# Patient Record
Sex: MALE | Race: WHITE | Employment: FULL TIME | URBAN - METROPOLITAN AREA
[De-identification: names, ages, dates, MRNs, and addresses within clinical notes are randomized per-mention and may not be internally consistent; named-entity substitution may affect disease eponyms.]

---

## 2020-06-05 ENCOUNTER — APPOINTMENT (OUTPATIENT)
Dept: GENERAL RADIOLOGY | Age: 45
End: 2020-06-05
Payer: COMMERCIAL

## 2020-06-05 ENCOUNTER — HOSPITAL ENCOUNTER (EMERGENCY)
Age: 45
Discharge: HOME | End: 2020-06-05
Payer: COMMERCIAL

## 2020-06-05 VITALS
TEMPERATURE: 98.6 F | HEIGHT: 78 IN | SYSTOLIC BLOOD PRESSURE: 110 MMHG | RESPIRATION RATE: 15 BRPM | HEART RATE: 48 BPM | WEIGHT: 249.12 LBS | DIASTOLIC BLOOD PRESSURE: 73 MMHG | OXYGEN SATURATION: 94 % | BODY MASS INDEX: 28.82 KG/M2

## 2020-06-05 LAB
A/G RATIO: 1.6 (ref 1.1–2.2)
ALBUMIN SERPL-MCNC: 4.4 G/DL (ref 3.4–5)
ALP BLD-CCNC: 66 U/L (ref 40–129)
ALT SERPL-CCNC: 24 U/L (ref 10–40)
ANION GAP SERPL CALCULATED.3IONS-SCNC: 11 MMOL/L (ref 3–16)
AST SERPL-CCNC: 20 U/L (ref 15–37)
BASOPHILS ABSOLUTE: 0 K/UL (ref 0–0.2)
BASOPHILS RELATIVE PERCENT: 0.6 %
BILIRUB SERPL-MCNC: 0.4 MG/DL (ref 0–1)
BUN BLDV-MCNC: 9 MG/DL (ref 7–20)
CALCIUM SERPL-MCNC: 9.1 MG/DL (ref 8.3–10.6)
CHLORIDE BLD-SCNC: 109 MMOL/L (ref 99–110)
CHP ED QC CHECK: YES
CO2: 20 MMOL/L (ref 21–32)
CREAT SERPL-MCNC: 0.9 MG/DL (ref 0.9–1.3)
D DIMER: 0.28 UG/ML FEU
EKG ATRIAL RATE: 64 BPM
EKG DIAGNOSIS: NORMAL
EKG P AXIS: 28 DEGREES
EKG P-R INTERVAL: 138 MS
EKG Q-T INTERVAL: 422 MS
EKG QRS DURATION: 110 MS
EKG QTC CALCULATION (BAZETT): 435 MS
EKG R AXIS: 19 DEGREES
EKG T AXIS: 29 DEGREES
EKG VENTRICULAR RATE: 64 BPM
EOSINOPHILS ABSOLUTE: 0.2 K/UL (ref 0–0.6)
EOSINOPHILS RELATIVE PERCENT: 3.2 %
GFR AFRICAN AMERICAN: >60
GFR NON-AFRICAN AMERICAN: >60
GLOBULIN: 2.8 G/DL
GLUCOSE BLD-MCNC: 107 MG/DL
GLUCOSE BLD-MCNC: 107 MG/DL (ref 70–99)
GLUCOSE BLD-MCNC: 112 MG/DL (ref 70–99)
HCT VFR BLD CALC: 40.4 % (ref 40.5–52.5)
HEMOGLOBIN: 13.3 G/DL (ref 13.5–17.5)
LYMPHOCYTES ABSOLUTE: 0.9 K/UL (ref 1–5.1)
LYMPHOCYTES RELATIVE PERCENT: 17.5 %
MCH RBC QN AUTO: 29.3 PG (ref 26–34)
MCHC RBC AUTO-ENTMCNC: 32.9 G/DL (ref 31–36)
MCV RBC AUTO: 88.9 FL (ref 80–100)
MONOCYTES ABSOLUTE: 0.5 K/UL (ref 0–1.3)
MONOCYTES RELATIVE PERCENT: 9.9 %
NEUTROPHILS ABSOLUTE: 3.8 K/UL (ref 1.7–7.7)
NEUTROPHILS RELATIVE PERCENT: 68.8 %
PDW BLD-RTO: 13.1 % (ref 12.4–15.4)
PERFORMED ON: ABNORMAL
PLATELET # BLD: 296 K/UL (ref 135–450)
PMV BLD AUTO: 7.2 FL (ref 5–10.5)
POTASSIUM REFLEX MAGNESIUM: 4 MMOL/L (ref 3.5–5.1)
RBC # BLD: 4.55 M/UL (ref 4.2–5.9)
SODIUM BLD-SCNC: 140 MMOL/L (ref 136–145)
TOTAL PROTEIN: 7.2 G/DL (ref 6.4–8.2)
TROPONIN: <0.01 NG/ML
WBC # BLD: 5.4 K/UL (ref 4–11)

## 2020-06-05 PROCEDURE — 85379 FIBRIN DEGRADATION QUANT: CPT

## 2020-06-05 PROCEDURE — 71046 X-RAY EXAM CHEST 2 VIEWS: CPT

## 2020-06-05 PROCEDURE — 99284 EMERGENCY DEPT VISIT MOD MDM: CPT

## 2020-06-05 PROCEDURE — 84484 ASSAY OF TROPONIN QUANT: CPT

## 2020-06-05 PROCEDURE — 93010 ELECTROCARDIOGRAM REPORT: CPT | Performed by: INTERNAL MEDICINE

## 2020-06-05 PROCEDURE — 85025 COMPLETE CBC W/AUTO DIFF WBC: CPT

## 2020-06-05 PROCEDURE — 36415 COLL VENOUS BLD VENIPUNCTURE: CPT

## 2020-06-05 PROCEDURE — 82947 ASSAY GLUCOSE BLOOD QUANT: CPT

## 2020-06-05 PROCEDURE — 80053 COMPREHEN METABOLIC PANEL: CPT

## 2020-06-05 PROCEDURE — 93005 ELECTROCARDIOGRAM TRACING: CPT | Performed by: PHYSICIAN ASSISTANT

## 2020-06-05 ASSESSMENT — ENCOUNTER SYMPTOMS
NAUSEA: 0
SINUS PAIN: 0
DIARRHEA: 0
ABDOMINAL PAIN: 0
RHINORRHEA: 0
SORE THROAT: 0
EYE DISCHARGE: 0
SHORTNESS OF BREATH: 0
CHEST TIGHTNESS: 0
COUGH: 0
CONSTIPATION: 0
SINUS PRESSURE: 0
VOMITING: 0
EYE REDNESS: 0

## 2020-06-05 NOTE — ED PROVIDER NOTES
[address] ENCOUNTER        Pt Name: Hannah Mejia  MRN: [id number]  [place] 1975  Date of evaluation: 6/5/2020  Provider: Gretchen Oliva PA-C  PCP: Jake Tesfaye, [address] [place] [address]       Chief Complaint   Patient presents with    Anxiety     pt. just not feeling right for past 2 days, under \"immense\" amount of stress, no chest pain or shortness of breath    Fatigue     pt. feels light headed and tingling all over today       HISTORY OF PRESENT ILLNESS   (Location/Symptom, Timing/Onset, Context/Setting, Quality, Duration, Modifying Factors, Severity)  Note limiting factors. Hannah Mejia is a 41 y.o. male presents to the ED via private vehicle accompanied by his wife who is at bedside for a 1 day history of feeling palpations in his chest, intermittent, consistent with past times patient has been in atrial fibrillation. He reports associated paresthesias to upper extremities and his toes in addition to a near syncope-like sensation. Patient denies any shortness of breath fevers coughing or nausea. He has had a tremendous amount of stress in his life recently. Patient denies a history of hypertension denies any family history of coronary artery disease. Patient is not diabetic. Patient does use chewing tobacco.  But he is a non-smoker. Patient takes a daily aspirin. Nursing Notes were all reviewed and agreed with or any disagreements were addressed  in the HPI. REVIEW OF SYSTEMS  (2-9 systems for level 4, 10 or more for level 5)     Review of Systems   Constitutional: Negative for chills and fever. HENT: Negative. Negative for congestion, rhinorrhea, sinus pressure, sinus pain and sore throat. Eyes: Negative for discharge, redness and visual disturbance. Respiratory: Negative for cough, chest tightness and shortness of breath. Cardiovascular: Positive for palpitations. Negative for chest pain.    Gastrointestinal: Negative for abdominal pain, constipation, diarrhea, nausea and vomiting. Genitourinary: Negative for difficulty urinating, dysuria and frequency. Musculoskeletal: Negative. Skin: Negative. Neurological: Positive for light-headedness and numbness. Negative for dizziness, weakness and headaches. Psychiatric/Behavioral: Negative. All other systems reviewed and are negative. Positivesand Pertinent negatives as per HPI. Except as noted above in the ROS, all other systems were reviewed and negative. PAST MEDICAL HISTORY     Past Medical History:   Diagnosis Date    Atrial fibrillation ([facility] [address]     Headache          SURGICAL HISTORY     History reviewed. No pertinent surgical history. CURRENT MEDICATIONS       There are no discharge medications for this patient. ALLERGIES     Sulfa antibiotics    FAMILY HISTORY     History reviewed. No pertinent family history.       SOCIAL HISTORY       Social History     Socioeconomic History    Marital status:      Spouse name: None    Number of children: None    Years of education: None    Highest education level: None   Occupational History    None   Social Needs    Financial resource strain: None    Food insecurity     Worry: None     Inability: None    Transportation needs     Medical: None     Non-medical: None   Tobacco Use    Smoking status: Never Smoker    Smokeless tobacco: Current User     Types: Chew   Substance and Sexual Activity    Alcohol use: Yes     Comment: occasionally    Drug use: Never    Sexual activity: Yes     Partners: Female   Lifestyle    Physical activity     Days per week: None     Minutes per session: None    Stress: None   Relationships    Social connections     Talks on phone: None     Gets together: None     Attends Quaker service: None     Active member of club or organization: None     Attends meetings of clubs or organizations: None     Relationship status: None    Intimate partner American >60 >60    GFR African American >60 >60    Calcium 9.1 8.3 - 10.6 mg/dL    Total Protein 7.2 6.4 - 8.2 g/dL    Alb 4.4 3.4 - 5.0 g/dL    Albumin/Globulin Ratio 1.6 1.1 - 2.2    Total Bilirubin 0.4 0.0 - 1.0 mg/dL    Alkaline Phosphatase 66 40 - 129 U/L    ALT 24 10 - 40 U/L    AST 20 15 - 37 U/L    Globulin 2.8 g/dL   Troponin   Result Value Ref Range    Troponin <0.01 <0.01 ng/mL   D-Dimer, Quantitative   Result Value Ref Range    D-Dimer, Quant 0.28 <0.50 ug/mL FEU   POCT Glucose   Result Value Ref Range    Glucose 107 mg/dL    QC OK? yes    POCT Glucose   Result Value Ref Range    POC Glucose 107 (H) 70 - 99 mg/dl    Performed on ACCU-CHEK    EKG 12 Lead   Result Value Ref Range    Ventricular Rate 64 BPM    Atrial Rate 64 BPM    P-R Interval 138 ms    QRS Duration 110 ms    Q-T Interval 422 ms    QTc Calculation (Bazett) 435 ms    P Axis 28 degrees    R Axis 19 degrees    T Axis 29 degrees    Diagnosis       Normal sinus rhythmRSR' pattern in [id number] ECGNo previous ECGs availableConfirmed by Sybil, [address] [place] [address] ([facility]) on 6/5/2020 12:49:51 PM       I estimate there is LOW risk for ACUTE CORONARY SYNDROME, INTRACRANIAL HEMORRHAGE, MALIGNANT DYSRHYTHMIA, MENINGITIS, PNEUMONIA, PULMONARY EMBOLISM, SEPSIS, SUBARACHNOID HEMORRHAGE, SUBDURAL HEMATOMA, STROKE, or URINARY TRACT INFECTION, thus I consider the discharge disposition reasonable. Angela Bright and I have discussed the diagnosis and risks, and we agree with discharging home to follow-up with their primary doctor. We also discussed returning to the Emergency Department immediately if new or worsening symptoms occur. We have discussed the symptoms which are most concerning (e.g., changing or worsening pain, weakness, vomiting, fever) that necessitate immediate return. FINAL IMPRESSION      1. Bradycardia    2. Other fatigue    3.  H/O atrial fibrillation without current medication          DISPOSITION/PLAN   DISPOSITION    D/c to home    PATIENT

## 2020-06-23 NOTE — PROGRESS NOTES
[address]   Cardiac Consultation    Referring Provider:  Zonia Bell DO     Chief Complaint   Patient presents with    New Patient     atypical chest awareness     Bradycardia     hosp f/u       HPI:  Fermin Villagomez is a 41 y.o. male who presented to [address] [place] [address] on 6/5/2020 after numbness and tingling in his hands then his feet. He was quite concerned and called his wife who took him to [address] [place] [address],[address]. He was told his heart rate was 42 once in [facility] which frightened him. Since then he has had resting soreness in chest that has not been positional. He can perform his karl activities including heavy work without difficulty. He has minimal risks for cad, no fh, but 2014 mild lipid abnormalities. He has a past medical history significant for atrial fibrillation. He thinks he has had four episodes in his life, the last a couple years ago. He is not worried about those. He is not currently on any medications. He is compliant with his medications and tolerating them well. He denies edema, shortness of breath, exertional sx,  presyncope,  PND or orthopnea. His wife states he has started to snore but feels it was a results of his sinus issues. Denies a family history of cardiac disease. He came today to discuss his chest awareness/soreness at rest since 6/5/20. Past Medical History:   has a past medical history of Atrial fibrillation ([address] and Headache. Surgical History:   has no past surgical history on file. Social History:   reports that he has never smoked. His smokeless tobacco use includes chew. He reports current alcohol use. He reports that he does not use drugs. Family History:  family history is not on file. Home Medications:  No outpatient encounter medications on file as of 6/25/2020. No facility-administered encounter medications on file as of 6/25/2020.       Allergies:  Sulfa antibiotics     Review of Systems

## 2020-06-25 ENCOUNTER — OFFICE VISIT (OUTPATIENT)
Dept: CARDIOLOGY CLINIC | Age: 45
End: 2020-06-25
Payer: COMMERCIAL

## 2020-06-25 VITALS
DIASTOLIC BLOOD PRESSURE: 80 MMHG | HEIGHT: 78 IN | BODY MASS INDEX: 27.77 KG/M2 | HEART RATE: 73 BPM | SYSTOLIC BLOOD PRESSURE: 118 MMHG | WEIGHT: 240 LBS | TEMPERATURE: 98.5 F | OXYGEN SATURATION: 98 %

## 2020-06-25 PROCEDURE — 93000 ELECTROCARDIOGRAM COMPLETE: CPT | Performed by: INTERNAL MEDICINE

## 2020-06-25 PROCEDURE — 99203 OFFICE O/P NEW LOW 30 MIN: CPT | Performed by: INTERNAL MEDICINE

## 2021-02-02 ENCOUNTER — HOSPITAL ENCOUNTER (EMERGENCY)
Age: 46
Discharge: HOME | End: 2021-02-02
Attending: EMERGENCY MEDICINE
Payer: COMMERCIAL

## 2021-02-02 ENCOUNTER — APPOINTMENT (OUTPATIENT)
Dept: GENERAL RADIOLOGY | Age: 46
End: 2021-02-02
Payer: COMMERCIAL

## 2021-02-02 ENCOUNTER — TELEPHONE (OUTPATIENT)
Dept: ORTHOPEDIC SURGERY | Age: 46
End: 2021-02-02

## 2021-02-02 VITALS
DIASTOLIC BLOOD PRESSURE: 96 MMHG | HEIGHT: 78 IN | WEIGHT: 244.93 LBS | OXYGEN SATURATION: 99 % | RESPIRATION RATE: 20 BRPM | BODY MASS INDEX: 28.34 KG/M2 | SYSTOLIC BLOOD PRESSURE: 148 MMHG | TEMPERATURE: 98.2 F | HEART RATE: 86 BPM

## 2021-02-02 DIAGNOSIS — S82.891A CLOSED FRACTURE OF RIGHT ANKLE, INITIAL ENCOUNTER: Primary | ICD-10-CM

## 2021-02-02 PROCEDURE — 99283 EMERGENCY DEPT VISIT LOW MDM: CPT

## 2021-02-02 PROCEDURE — 73610 X-RAY EXAM OF ANKLE: CPT

## 2021-02-02 PROCEDURE — 29515 APPLICATION SHORT LEG SPLINT: CPT

## 2021-02-02 RX ORDER — ASPIRIN 81 MG/1
81 TABLET, CHEWABLE ORAL 2 TIMES DAILY
Qty: 14 TABLET | Refills: 0 | Status: SHIPPED | OUTPATIENT
Start: 2021-02-02 | End: 2021-02-09

## 2021-02-02 ASSESSMENT — PAIN SCALES - GENERAL
PAINLEVEL_OUTOF10: 4
PAINLEVEL_OUTOF10: 3

## 2021-02-02 ASSESSMENT — PAIN DESCRIPTION - PAIN TYPE
TYPE: ACUTE PAIN
TYPE: ACUTE PAIN

## 2021-02-02 ASSESSMENT — PAIN DESCRIPTION - LOCATION
LOCATION: ANKLE
LOCATION: ANKLE

## 2021-02-02 ASSESSMENT — PAIN - FUNCTIONAL ASSESSMENT: PAIN_FUNCTIONAL_ASSESSMENT: 0-10

## 2021-02-02 NOTE — LETTER
retu                                  [address] [facility] [address]  Phone: [phone]               February 2, 2021    Patient: Renetta Ibarra   YOB: 1975   Date of Visit: 2/2/2021       To Whom It May Concern:    Lina Barrera was seen and treated in our emergency department on 2/2/2021. He may return to work on after seen by orthopedic physician.  .      Sincerely,               Signature:__________________________________

## 2021-02-02 NOTE — ED PROVIDER NOTES
Triage Chief Complaint:   Ankle Pain (Right ankle injury foot got caught on lices[place]se plate of work truck while getting out of the bed of the truck. Patient went forward and right foot remained in one place.  )    [place]:  Eugene Valdivia is a 41 y.o. male that presents complaining of right ankle pain status post injury earlier today. The patient was getting out of his work truck when his foot got caught on the license plate prompting the foot to remain in place while he moved forward. This stumble caused the foot to \"wrenched\" and he now notes pain to the medial right ankle. No pain to any other body part. No knee pain no shin pain no foot pain. No loss of consciousness. No other complaints no numbness. He is on no anticoagulation meds. ROS:  At least 9 systems reviewed and otherwise acutely negative except as in the [address] Past Medical History:   Diagnosis Date    Atrial fibrillation ([facility] [address]     Headache      History reviewed. No pertinent surgical history. History reviewed. No pertinent family history.   Social History     Socioeconomic History    Marital status:      Spouse name: Not on file    Number of children: Not on file    Years of education: Not on file    Highest education level: Not on file   Occupational History    Not on file   Social Needs    Financial resource strain: Not on file    Food insecurity     Worry: Not on file     Inability: Not on file    Transportation needs     Medical: Not on file     Non-medical: Not on file   Tobacco Use    Smoking status: Never Smoker    Smokeless tobacco: Current User     Types: Chew   Substance and Sexual Activity    Alcohol use: Yes     Comment: occasionally    Drug use: Never    Sexual activity: Yes     Partners: Female   Lifestyle    Physical activity     Days per week: Not on file     Minutes per session: Not on file    Stress: Not on file   Relationships    Social connections     Talks on phone: Not on file     Gets together: Not on file     Attends Taoism service: Not on file     Active member of club or organization: Not on file     Attends meetings of clubs or organizations: Not on file     Relationship status: Not on file    Intimate partner violence     Fear of current or ex partner: Not on file     Emotionally abused: Not on file     Physically abused: Not on file     Forced sexual activity: Not on file   Other Topics Concern    Not on file   Social History Narrative    Not on file     No current facility-administered medications for this encounter. No current outpatient medications on file. Allergies   Allergen Reactions    Sulfa Antibiotics Rash       [unfilled]    Nursing Notes Reviewed    Physical Exam:  Vitals:    02/02/21 1321   BP: (!) 148/96   Pulse: 86   Resp: 20   Temp: 98.2 °F (36.8 °C)   SpO2: 99%       GENERAL APPEARANCE: Awake and alert. Cooperative. No acute distress. HEAD: Normocephalic. Atraumatic. EYES: EOM's grossly intact. Sclera anicteric. ENT: Mucous membranes are moist. Tolerates saliva. No trismus. NECK: Supple. No meningismus. Trachea midline. HEART: RRR. Radial pulses 2+. LUNGS: Respirations unlabored. CTAB  ABDOMEN: Soft. Non-tender. No guarding or rebound. EXTREMITIES: Focused exam of the of the right lower extremity shows no open injury, no crepitus, no obvious deformity other than swelling and slight ecchymosis to the medial malleolus. There are strong DP and PT pulses with soft compartments, appropriate capillary refill and full sensation. The extremity is grossly neurovascularly intact. Siobhan Salm SKIN: Warm and dry. NEUROLOGICAL: No gross facial drooping. Moves all 4 extremities spontaneously. PSYCHIATRIC: Normal mood. I have reviewed and interpreted all of the currently available lab results from this visit (if applicable):  No results found for this visit on 02/02/21.      Radiographs (if obtained):  [] The following radiograph was interpreted by myself in the absence of a

## 2021-02-02 NOTE — ED NOTES
Sugar tong placed on right lower extremity. Patient tolerated well. Patient given crutches demonstrated proper technique.        Oral DUNCAN Cassidy  02/02/21 9136

## 2021-02-02 NOTE — ED TRIAGE NOTES
Patient was at work in [employer] and was getting out of the bed of work truck. Right shoe got caught on license plate and patient went forward, but right foot did not move. Patient did not take shoe off till here. Right  Ankle swollen and slightly bruised.

## 2021-02-02 NOTE — ED NOTES
Discharge instructions reviewed. Patient verbalized understanding. Prescription x1 given. Patient discharged per wheelchair.        Sayda Robertson RN  02/02/21 3565

## 2021-02-03 ENCOUNTER — TELEPHONE (OUTPATIENT)
Dept: ORTHOPEDIC SURGERY | Age: 46
End: 2021-02-03

## 2021-02-03 NOTE — TELEPHONE ENCOUNTER
I left a message informing the patient that his wife is allowed to accompany him to his appointment tomorrow.

## 2021-02-03 NOTE — TELEPHONE ENCOUNTER
General Question     Subject: Accompany to appt. Patient and /or Facility Request: Marci Carrasquillo  Contact Number: [phone]    Would like to know if wife can accompany him to his appointment?

## 2021-02-04 ENCOUNTER — OFFICE VISIT (OUTPATIENT)
Dept: ORTHOPEDIC SURGERY | Age: 46
End: 2021-02-04
Payer: COMMERCIAL

## 2021-02-04 VITALS — HEIGHT: 78 IN | WEIGHT: 245 LBS | BODY MASS INDEX: 28.35 KG/M2 | TEMPERATURE: 97.4 F

## 2021-02-04 DIAGNOSIS — S82.841A CLOSED BIMALLEOLAR FRACTURE OF RIGHT ANKLE, INITIAL ENCOUNTER: Primary | ICD-10-CM

## 2021-02-04 PROCEDURE — L4361 PNEUMA/VAC WALK BOOT PRE OTS: HCPCS | Performed by: ORTHOPAEDIC SURGERY

## 2021-02-04 PROCEDURE — 99243 OFF/OP CNSLTJ NEW/EST LOW 30: CPT | Performed by: ORTHOPAEDIC SURGERY

## 2021-02-04 PROCEDURE — 27808 TREATMENT OF ANKLE FRACTURE: CPT | Performed by: ORTHOPAEDIC SURGERY

## 2021-02-04 NOTE — LETTER
[facility]  [place] [address] [address] [address]  Phone: [phone]  Fax: [phone]    Trever Venegas MD        February 4, 2021     Patient: Elias Esquivel   YOB: 1975   Date of Visit: 2/4/2021       To Whom It May Concern: It is my medical opinion that Shyla Patel may return to work on 2/8/21 to light duty: minimal walking. If you have any questions or concerns, please don't hesitate to call.     Sincerely,          Trever Venegas MD

## 2021-02-04 NOTE — LETTER
[facility]  [place] [address] [address] [address]  Phone: [phone]  Fax: [phone]    Mary Ann Redmond MD        February 4, 2021     Patient: Jerrod Rock   YOB: 1975   Date of Visit: 2/4/2021       To Whom It May Concern: It is my medical opinion that Elisabeth Devi may return to work on 2/8/21 with the following restrictions: non weightbearing through the right lower extremity, minimal walking through the left lower extremity. If you have any questions or concerns, please don't hesitate to call.     Sincerely,          Mary Ann Redmond MD

## 2021-02-04 NOTE — PROGRESS NOTES
Indira Memphis  <[id number]>  February 4, 2021    Chief Complaint   Patient presents with    Ankle Pain     [employer] [place] at work on 2/2/21           History: The patient is a 43-year-old gentleman who is here for evaluation of his right ankle. This is a Worker's Compensation case. The patient is a  and works on fire trucks. 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 He immediately noted right ankle pain and swelling. He ultimately presented to the emergency room and x-rays were obtained. He has been taking Advil. He was placed in a splint. This is a consult from Francisca Stanley MD for right ankle pain and swelling. The patient's  past medical history, medications, allergies,  family history, social history, and have been reviewed, and dated and are recorded in the chart. Pertinent items are noted in HPI. Review of systems reviewed from Pertinent History Form dated on 2/4/21 and available in the patient's chart under the Media tab. Temp 97.4 °F (36.3 °C)   Ht 6' 8\" (2.032 m)   Wt 245 lb (111.1 kg)   BMI 26.91 kg/m²     Physical Examination:   The patient presents today in no acute distress, awake, alert, and oriented. He is well dressed, nourished and  groomed. Patient with normal affect. Examination of the gait, showed that the patient walks with a crutch, NWB right leg and in a splint. Examination of both ankles showing a decreased range of motion of the right ankle compare to the other side. There is  moderate swelling that can be seen, as well as ecchymosis. He has intact sensation to light touch in the tibial, peroneal, sural, and saphenous nerve distributions. Pedal pulses are 1-2 +. He has significant tenderness on deep palpation over the Lateral and Medial malleolus of the right ankle. The foot shows brisk capillary refill. The patient is able to wiggle all toes. The patient is non tender to palpation of the mid foot, hind foot, or forefoot. The skin reveals no evidence of blistering or open areas. Imaging:  Xrays, 3 views of the right ankle from initial evaluation were reviewed. The x-rays confirm a nondisplaced distal third fibula fracture. There is also a nondisplaced medial malleolus fracture. The mortise is not widened. Impression:   Right Bimalleolar ankle fracture. Plan: The xray findings were reviewed with the patient and we will immobilize the ankle with a tall boot. He was instructed on the following: Natural history and expected course discussed. Questions answered. Rest, ice, compression, and elevation (RICE) therapy. Follow up will be in 4 week(s). He should be NWB.  3 views of the right ankle will be obtained out of the cast/boot. We will work aggressively on range of motion after next visit. The patient may begin light range of motion to the right ankle in 3 weeks. He may return to light duty work on Monday. The patient is to limit his walking at work. The patient is completely aware that weightbearing could cause displacement of the fractures and ultimately require surgery. The patient is having severe difficulty with crutches. We will go ahead and give the patient a prescription for a rollabout scooter.

## 2021-03-04 ENCOUNTER — OFFICE VISIT (OUTPATIENT)
Dept: ORTHOPEDIC SURGERY | Age: 46
End: 2021-03-04

## 2021-03-04 VITALS
HEART RATE: 85 BPM | WEIGHT: 245 LBS | BODY MASS INDEX: 28.35 KG/M2 | TEMPERATURE: 97.3 F | SYSTOLIC BLOOD PRESSURE: 115 MMHG | DIASTOLIC BLOOD PRESSURE: 82 MMHG | HEIGHT: 78 IN

## 2021-03-04 DIAGNOSIS — S82.841A CLOSED BIMALLEOLAR FRACTURE OF RIGHT ANKLE, INITIAL ENCOUNTER: Primary | ICD-10-CM

## 2021-03-04 PROCEDURE — 99024 POSTOP FOLLOW-UP VISIT: CPT | Performed by: ORTHOPAEDIC SURGERY

## 2021-03-04 NOTE — LETTER
[facility]  [place] [address] [address] [address]  Phone: [phone]  Fax: [phone]    Mary Ann Redmond MD        March 4, 2021     Patient: Jerrod Rock   YOB: 1975   Date of Visit: 3/4/2021       To Whom It May Concern: It is my medical opinion that Elisabeth Devi may return to work light duty: must wear the boot while at work. If you have any questions or concerns, please don't hesitate to call.     Sincerely,          Mary Ann Redmond MD

## 2021-03-04 NOTE — PROGRESS NOTES
William Cummins  <[id number]>  March 4, 2021    Chief Complaint   Patient presents with    Follow-up     Rt ankle. [facility]  injury on 2/2/21           History: The patient is a 52-year-old gentleman who is here for evaluation of his right ankle. This is a Worker's Compensation case. The patient works for [employer]. 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 He is working light duty. He has been using his knee scooter. He reports mild pain in the right ankle. The patient is 1 month post injury. The patient's  past medical history, medications, allergies,  family history, social history, and have been reviewed, and dated and are recorded in the chart. Pertinent items are noted in HPI. Review of systems reviewed from Pertinent History Form dated on 2/4/21 and available in the patient's chart under the Media tab. /82   Pulse 85   Temp 97.3 °F (36.3 °C)   Ht 6' 8\" (2.032 m)   Wt 245 lb (111.1 kg)   BMI 26.91 kg/m²     Physical Examination:   On examination today, the patient is alert and oriented x3. The patient does have significant atrophy of his right calf. He has mild tenderness to palpation over the distal fibula and the medial malleolus. He is neurovascularly intact in the right lower extremity. Examination of the skin reveals no lesions or ulcerations. He is able to lightly dorsiflex and plantarflex the right ankle without difficulty. He is nontender in the foot. Imaging:  Xrays, 3 views of the right ankle obtained in the office today were reviewed. The bimalleolar ankle fracture is healing well. There has been no further displacement of the fractures. Impression:   Right Bimalleolar ankle fracture. Plan: At this time, we will continue to treat the patient conservatively. He may begin to weight-bear as tolerated with the boot.   We will get the patient into a therapy program to work on range of motion and

## 2021-03-25 ENCOUNTER — OFFICE VISIT (OUTPATIENT)
Dept: ORTHOPEDIC SURGERY | Age: 46
End: 2021-03-25

## 2021-03-25 VITALS
DIASTOLIC BLOOD PRESSURE: 77 MMHG | TEMPERATURE: 97.7 F | HEIGHT: 78 IN | HEART RATE: 70 BPM | SYSTOLIC BLOOD PRESSURE: 109 MMHG | BODY MASS INDEX: 28.35 KG/M2 | WEIGHT: 245 LBS

## 2021-03-25 DIAGNOSIS — S82.841A CLOSED BIMALLEOLAR FRACTURE OF RIGHT ANKLE, INITIAL ENCOUNTER: Primary | ICD-10-CM

## 2021-03-25 PROCEDURE — 99024 POSTOP FOLLOW-UP VISIT: CPT | Performed by: ORTHOPAEDIC SURGERY

## 2021-03-25 NOTE — LETTER
[facility]  [place] [address] [address] [address]  Phone: [phone]  Fax: [phone]    Stefania Mason MD        March 25, 2021     Patient: Cody Flores   YOB: 1975   Date of Visit: 3/25/2021       To Whom It May Concern: It is my medical opinion that Latisha Byrnes may continue working light duty out of the boot until 4/16/21. Full duty on 4/19/21    If you have any questions or concerns, please don't hesitate to call.     Sincerely,        Stefania Mason MD

## 2021-03-25 NOTE — PROGRESS NOTES
Renetta Ibarra  <[id number]>  March 25, 2021    Chief Complaint   Patient presents with    Follow-up     Rt ankle  [address] [facility] 2/2/21           History: The patient is a 28-year-old gentleman who is here for evaluation of his right ankle. This is a Worker's Compensation case. The patient works for [employer]. 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 He is weightbearing as tolerated in the boot and having no pain. The patient is 7 weeks post injury. The patient's  past medical history, medications, allergies,  family history, social history, and have been reviewed, and dated and are recorded in the chart. Pertinent items are noted in HPI. Review of systems reviewed from Pertinent History Form dated on 2/4/21 and available in the patient's chart under the Media tab. /77   Pulse 70   Temp 97.7 °F (36.5 °C)   Ht 6' 8\" (2.032 m)   Wt 245 lb (111.1 kg)   BMI 26.91 kg/m²     Physical Examination:   On examination today, the patient is alert and oriented x3. The contour of the right calf has been improving. The atrophy of the right calf is much less compared to last visit. He has no tenderness to palpation over the distal fibula and the medial malleolus. He is neurovascularly intact in the right lower extremity. Examination of the skin reveals no lesions or ulcerations. He is able to lightly dorsiflex and plantarflex the right ankle without difficulty. He is nontender in the foot. Imaging:  Xrays, 3 views of the right ankle obtained in the office today were reviewed. The bimalleolar ankle fracture is healing well. There has been no further displacement of the fractures. The fractures are filling in rather well. Impression:   Right Bimalleolar ankle fracture. Plan: At this time, we will continue to treat the patient conservatively. The patient was instructed to discontinue the boot. He is to work aggressively on range of motion and strengthening. We will get the patient into a formal physical therapy program to work on his balance, gait and proprioception. He will also work on general strengthening and range of motion. He will follow-up with me in approximately 4 weeks and we will reassess him then. At follow-up, final x-rays of the right ankle will be obtained. We will likely release the patient after next visit. We will keep him at light duty for the next 3 weeks. He then may return to full duty work out of the boot.

## 2021-04-15 ENCOUNTER — OFFICE VISIT (OUTPATIENT)
Dept: ORTHOPEDIC SURGERY | Age: 46
End: 2021-04-15

## 2021-04-15 VITALS — HEIGHT: 78 IN | BODY MASS INDEX: 28.35 KG/M2 | WEIGHT: 245 LBS

## 2021-04-15 DIAGNOSIS — S82.841A CLOSED BIMALLEOLAR FRACTURE OF RIGHT ANKLE, INITIAL ENCOUNTER: Primary | ICD-10-CM

## 2021-04-15 PROCEDURE — 99024 POSTOP FOLLOW-UP VISIT: CPT | Performed by: ORTHOPAEDIC SURGERY

## 2021-04-15 NOTE — LETTER
[facility]  [address] [address]  Phone: [phone]  Fax: [phone]    Yimi Mercado MD        April 15, 2021     Patient: Gladis High   YOB: 1975   Date of Visit: 4/15/2021       To Whom It May Concern: It is my medical opinion that Bradley Lopez may return to full duty immediately with no restrictions. If you have any questions or concerns, please don't hesitate to call.     Sincerely,          Yimi Mercado MD

## 2021-04-15 NOTE — PROGRESS NOTES
Dell Champion  <[id number]>  April 15, 2021    Chief Complaint   Patient presents with    Follow-up     [facility], Right Bimalleolar ankle fracture; doi 2/2/21. History: The patient is a 42-year-old gentleman who is here for evaluation of his right ankle. This is a Worker's Compensation case. The patient works for [employer]. 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 He has been out of the boot without any issues. He has been working light duty. He would like to return to full duty work. He is 2-1/2 months post injury. The patient's  past medical history, medications, allergies,  family history, social history, and have been reviewed, and dated and are recorded in the chart. Pertinent items are noted in HPI. Review of systems reviewed from Pertinent History Form dated on 2/4/21 and available in the patient's chart under the Media tab. Ht 6' 8\" (2.032 m)   Wt 245 lb (111.1 kg)   BMI 26.91 kg/m²     Physical Examination:   On examination today, the patient is alert and oriented x3. The contour of the right calf has been improving. The atrophy of the right calf is much less compared to last visit. He has no tenderness to palpation over the distal fibula and the medial malleolus. He is neurovascularly intact in the right lower extremity. Examination of the skin reveals no lesions or ulcerations. He is able to dorsiflex and plantarflex the right ankle without difficulty. He is nontender in the foot. Imaging:  Xrays, 3 views of the right ankle obtained in the office today were reviewed. The bimalleolar ankle fracture is completely healed. There has been no further displacement of the fractures. Impression:   Right Bimalleolar ankle fracture. Plan: At this time, we will continue to treat the patient conservatively. The patient was instructed to discontinue the boot.   He is to work aggressively on range of motion and

## 2022-02-08 ENCOUNTER — TELEPHONE (OUTPATIENT)
Dept: CARDIOLOGY CLINIC | Age: 47
End: 2022-02-08

## 2022-02-08 NOTE — PROGRESS NOTES
 Sulfa Antibiotics Rash       Medication:   Prior to Admission medications    Medication Sig Start Date End Date Taking? Authorizing Provider   metoprolol succinate (TOPROL XL) 25 MG extended release tablet Take 1 tablet by mouth daily as needed (for episdoes of afib.) 2/9/22  Yes Jose Hsieh MD   aspirin (ASPIRIN CHILDRENS) 81 MG chewable tablet Take 1 tablet by mouth 2 times daily for 7 days 2/2/21 2/9/21  Kaleb Ovalles MD       Social History:   reports that he has never smoked. His smokeless tobacco use includes chew. He reports current alcohol use. He reports that he does not use drugs. Family History:  family history is not on file. Reviewed. Denies family history of sudden cardiac death, arrhythmia, premature CAD    Review of System:  Pertinent positive and negatives are in the HPI, the rest are negative. Physical Examination:  /82 (Site: Left Wrist, Position: Sitting) Comment: home BP monitor  Pulse 77   Ht 6' 8\" (2.032 m)   Wt 247 lb (112 kg)   SpO2 97%   BMI 27.13 kg/m²      · Constitutional: Oriented. No distress. · Head: Normocephalic and atraumatic. · Mouth/Throat: Oropharynx is clear and moist.   · Eyes: Conjunctivae normal. EOM are normal.   · Neck: Normal range of motion. Neck supple. No rigidity. No JVD present. · Cardiovascular: Normal rate, regular rhythm, S1&S2 and intact distal pulses. · Pulmonary/Chest: Bilateral respiratory sounds. No wheezes. No rhonchi. · Abdominal: Soft. Bowel sounds present. No distension, No tenderness. · Musculoskeletal: No tenderness. No edema    · Lymphadenopathy: Has no cervical adenopathy. · Neurological: Alert and oriented. Cranial nerve appears intact, No Gross deficit   · Skin: Skin is warm and dry. No rash noted. · Psychiatric: Has a normal mood, affect and behavior     Labs:  Reviewed. ECG: reviewed, Sinus  rhythm with v-rate of 71 bpm with QRS duration 106 ms.  No ventricular pre-excitation, or QT prolongation. Studies:   1. Event monitor: n/a      2. Echo: 8/14/2018  Candice Lowe)  Estimated Left Ventricular Ejection Fraction  =50%   Left ventricular cavity size normal. Left ventricular wall thickness is normal.   Left ventricular ejection fraction is in the normal range   The right ventricle is normal in size and function. The right atrium is normal in size. The left atrium is normal in size. There is mild prolapse of the posterior mitral valve leaflet.  Trace to mild mitral regurgitation. Structurally normal aortic valve without significant sclerosis or stenosis.    There is no aortic regurgitation. Structurally normal tricuspid valve. Trace tricuspid regurgitation. Structurally normal pulmonic valve without significant stenosis.    There is no pulmonic regurgitation. Normal pericardium without effusion. Normal aortic root dimension. CONCLUSIONS   Left ventricular ejection fraction is in the normal range. 3. Stress Test:  n/a      4. Cath: n/a    I independently reviewed the ECG, MCOT, echocardiogram, stress test, and coronary angiography/PCI results and used them for my plan of care. Procedures:  1. [id number] Score for Atrial Fibrillation Stroke Risk   Risk   Factors  Component Value   C CHF No 0   H HTN No 0   A2 Age >= 76 No,  (40 y.o.) 0   D DM No 0   S2 Prior Stroke/TIA No 0   V Vascular Disease No 0   A Age 74-69 No,  (39 y.o.) 0   Sc Sex male 0    [id number]  Score  0   Score last updated 2/8/22 6:65 AM EST    Click here for a link to the UpToDate guideline \"Atrial Fibrillation: Anticoagulation therapy to prevent embolization    Disclaimer: Risk Score calculation is dependent on accuracy of patient problem list and past encounter diagnosis.     Assessment/Plan:     Paroxysmal Atrial fibrillation   -First diagnosed in 2014.   -He has a [id number] Score 0.   -According to the ACC/HRS guidelines, with a [id number] score of 0, he is not indicated for [address] [place] [address] at this time. -EKG today shows SR. Mr. Ajay Small has paroxysmal afib, last \"bad\" episode 2 days ago lasting most of the day. Previous episode about 2 yrs prior. Maybe other smaller episodes but unclear since has anxiety. Discussed treatment options below and monitoring. Given how bad he felt last time, they opted for event monitor to document if truly afib or SVT and would help with monitoring to guide escalation of therapy. No anticoagulation (low stroke risk) and metoprolol as needed    We discussed treatment options including antiarrhythmics, rate control with anticoagulation, and ablation. We discussed progressive nature of atrial fibrillation. Treatment success decreases when AF becomes persistent and last more than 6 months. Antiarrhythmic therapy, side effects, benefits and alternative discussed. Atrial fibrillation ablation procedure was discussed. We discussed the need for repeat procedure. On average patients may need more than one ablation procedure.      -Discussed restarting on Toprol XL 25 mg daily. Patient agreeable. · Start Toprol XL 25 mg daily PRN advised that he can take extra dose as needed.     -Discussed wearing a MCOT to assess afib burden. Recommend he wear 14 day monitor and if unrevealing I recommend implantation of an ILR which would allow for long term monitoring for afib burden. · Patient agreeable to wear 14 day CAM patch monitor. · We will call with the results of the monitor. If no arrhthymias are seen we will proceed with implantation of the ILR and he will follow up one week after implantation in the device clinic for site check and device interrogation. If atrial fibrillation or other arrhthymias are seen will have patient come back into office to further discuss treatment options. Anxiety   - currently on medications. Will see if truly anxiety or has other cardiac arrhythmias.      Thank you for allowing me to participate in the care of José Zuhair

## 2022-02-08 NOTE — TELEPHONE ENCOUNTER
Please contact [phone] records to obtain tracing from EKG 8/11/2014, 8/7/2014 and also scanned rhythm strips for 8/11/2014.     Thanks,  Nhung Magallon RN

## 2022-02-09 ENCOUNTER — OFFICE VISIT (OUTPATIENT)
Dept: CARDIOLOGY CLINIC | Age: 47
End: 2022-02-09

## 2022-02-09 VITALS
HEIGHT: 78 IN | HEART RATE: 77 BPM | OXYGEN SATURATION: 97 % | WEIGHT: 247 LBS | DIASTOLIC BLOOD PRESSURE: 82 MMHG | SYSTOLIC BLOOD PRESSURE: 137 MMHG | BODY MASS INDEX: 28.58 KG/M2

## 2022-02-09 DIAGNOSIS — I48.0 PAF (PAROXYSMAL ATRIAL FIBRILLATION) (HCC): Primary | ICD-10-CM

## 2022-02-09 PROCEDURE — 93000 ELECTROCARDIOGRAM COMPLETE: CPT | Performed by: INTERNAL MEDICINE

## 2022-02-09 PROCEDURE — 99214 OFFICE O/P EST MOD 30 MIN: CPT | Performed by: INTERNAL MEDICINE

## 2022-02-09 RX ORDER — METOPROLOL SUCCINATE 25 MG/1
25 TABLET, EXTENDED RELEASE ORAL DAILY PRN
Qty: 30 TABLET | Refills: 3 | Status: SHIPPED | OUTPATIENT
Start: 2022-02-09

## 2022-02-09 NOTE — PATIENT INSTRUCTIONS
Patient Education        Learning About Atrial Fibrillation  What is atrial fibrillation? Atrial fibrillation (say \"AY-tree-chepe swo-inwb-OMP-pelonun\") is a common type of irregular heartbeat (arrhythmia). Normally, the heart beats in a strong, steady rhythm. In atrial fibrillation, a problem with the heart's electrical system causes the two upper chambers of the heart (called the atria) to quiver, or fibrillate. Atrial fibrillation can be dangerous. This is because if the heartbeat isn't strong and steady, blood can collect, or pool, in the atria. And pooled blood is more likely to form clots. Clots can travel to the brain, block blood flow, and cause a stroke. Atrial fibrillation can also lead to heart failure. This condition also upsets the normal rhythm between the atria and the lower chambers of the heart. (These chambers are called the ventricles.) The ventricles may beat fast and without a regular rhythm. What are the symptoms? Some people feel symptoms when they have episodes of atrial fibrillation. But other people don't notice any symptoms. If you have symptoms, you may feel:  · A fluttering, racing, or pounding feeling in your chest called palpitations. · Weak or tired. · Dizzy or lightheaded. · Short of breath. · Chest pain. · Confused. You may notice signs of atrial fibrillation when you check your pulse. Your pulse may seem uneven or fast.  What can you expect when you have atrial fibrillation? At first, spells of atrial fibrillation may come on suddenly and last a short time. They may go away on their own or with treatment. Over time, the spells may last longer and occur more often. They often don't go away on their own. How is it treated? Treatments can help you feel better and prevent future problems, especially stroke and heart failure. Your treatment will depend on the cause of your atrial fibrillation, your symptoms, and your risk for stroke.  Types of treatment include:  · Heart rate treatment. Medicine may be used to slow your heart rate. Your heartbeat may still be irregular. But these medicines keep your heart from beating too fast. They may also help relieve symptoms. · Heart rhythm treatment. Different treatments may be used to try to stop atrial fibrillation and keep it from returning. They can also relieve symptoms. These treatments include medicine, electrical cardioversion to shock the heart back to a normal rhythm, a procedure called catheter ablation, and heart surgery. · Stroke prevention. You and your doctor can decide how to lower your risk. You may decide to take a blood-thinning medicine called an anticoagulant. What is a heart-healthy lifestyle for atrial fibrillation? You can live well and help manage atrial fibrillation by having a heart-healthy lifestyle. This lifestyle may help reduce how often you have episodes of atrial fibrillation. Don't smoke. Avoid secondhand smoke too. Quitting smoking is the best thing you can do for your heart. Be active. Talk to your doctor about what type and level of exercise is safe for you. Eat a heart-healthy diet. These foods include vegetables, fruits, nuts, beans, lean meat, fish, and whole grains. Limit sodium, alcohol, and sugar. Avoid alcohol if it triggers symptoms. Stay at a healthy weight. Lose weight if you need to. Losing weight can help relieve symptoms. Manage other health problems. These problems include diabetes, high blood pressure, and high cholesterol. If you think you may have a problem with alcohol or drug use, talk to your doctor. Manage stress. Options like yoga, biofeedback, and meditation may help. Where can you learn more? Go to https://amricarmen.[email or web address] org and sign in to your Dinglepharb account. Enter P141 in the Cumulux box to learn more about \"Learning About Atrial Fibrillation. \"     If you do not have an account, please click on the \"Sign Up Now\" link.  Current as of: April 29, 2021               Content Version: 13.1  © 2006-2021 [employer]. Care instructions adapted under license by [facility] [place] ([facility]). If you have questions about a medical condition or this instruction, always ask your healthcare professional. [address] any warranty or liability for your use of this information. Patient Education        Atrial Fibrillation: Care Instructions  Your Care Instructions     Atrial fibrillation is an irregular and often fast heartbeat. Treating this condition is important for several reasons. It can cause blood clots, which can travel from your heart to your brain and cause a stroke. If you have a fast heartbeat, you may feel lightheaded, dizzy, and weak. An irregular heartbeat can also increase your risk for heart failure. Atrial fibrillation is often the result of another heart condition, such as high blood pressure or coronary artery disease. Making changes to improve your heart condition will help you stay healthy and active. Follow-up care is a key part of your treatment and safety. Be sure to make and go to all appointments, and call your doctor if you are having problems. It's also a good idea to know your test results and keep a list of the medicines you take. How can you care for yourself at home? Medicines    · Take your medicines exactly as prescribed. Call your doctor if you think you are having a problem with your medicine. You will get more details on the specific medicines your doctor prescribes.     · If your doctor has given you a blood thinner to prevent a stroke, be sure you get instructions about how to take your medicine safely. Blood thinners can cause serious bleeding problems.     · Do not take any vitamins, over-the-counter drugs, or herbal products without talking to your doctor first.   Lifestyle changes    · Do not smoke. Smoking can increase your chance of a stroke and heart attack.  If you need help quitting, talk to your doctor about stop-smoking programs and medicines. These can increase your chances of quitting for good.     · Eat a heart-healthy diet.     · Stay at a healthy weight. Lose weight if you need to.     · Limit alcohol to 2 drinks a day for men and 1 drink a day for women. Too much alcohol can cause health problems.     · Avoid colds and flu. Get a pneumococcal vaccine shot. If you have had one before, ask your doctor whether you need another dose. Get a flu shot every year. If you must be around people with colds or flu, wash your hands often. Activity    · If your doctor recommends it, get more exercise. Walking is a good choice. Bit by bit, increase the amount you walk every day. Try for at least 30 minutes on most days of the week. You also may want to swim, bike, or do other activities. Your doctor may suggest that you join a cardiac rehabilitation program so that you can have help increasing your physical activity safely.     · Start light exercise if your doctor says it is okay. Even a small amount will help you get stronger, have more energy, and manage stress. Walking is an easy way to get exercise. Start out by walking a little more than you did in the hospital. Gradually increase the amount you walk.     · When you exercise, watch for signs that your heart is working too hard. You are pushing too hard if you cannot talk while you are exercising. If you become short of breath or dizzy or have chest pain, sit down and rest immediately.     · Check your pulse regularly. Place two fingers on the artery at the palm side of your wrist, in line with your thumb. If your heartbeat seems uneven or fast, talk to your doctor. When should you call for help? Call 911 anytime you think you may need emergency care. For example, call if:    · You have symptoms of a heart attack. These may include:  ?  Chest pain or pressure, or a strange feeling in the chest.  ? Sweating. ? Shortness of breath. ? Nausea or vomiting. ? Pain, pressure, or a strange feeling in the back, neck, jaw, or upper belly or in one or both shoulders or arms. ? Lightheadedness or sudden weakness. ? A fast or irregular heartbeat. After you call 911, the  may tell you to chew 1 adult-strength or 2 to 4 low-dose aspirin. Wait for an ambulance. Do not try to drive yourself.     · You have symptoms of a stroke. These may include:  ? Sudden numbness, tingling, weakness, or loss of movement in your face, arm, or leg, especially on only one side of your body. ? Sudden vision changes. ? Sudden trouble speaking. ? Sudden confusion or trouble understanding simple statements. ? Sudden problems with walking or balance. ? A sudden, severe headache that is different from past headaches.     · You passed out (lost consciousness). Call your doctor now or seek immediate medical care if:    · You have new or increased shortness of breath.     · You feel dizzy or lightheaded, or you feel like you may faint.     · Your heart rate becomes irregular.     · You can feel your heart flutter in your chest or skip heartbeats. Tell your doctor if these symptoms are new or worse. Watch closely for changes in your health, and be sure to contact your doctor if you have any problems. Where can you learn more? Go to https://Kapow EventspetroyewComplete [email or web address].[email or web address]. org and sign in to your Pepperdata account. Enter U020 in the [facility] box to learn more about \"Atrial Fibrillation: Care Instructions. \"     If you do not have an account, please click on the \"Sign Up Now\" link. Current as of: April 29, 2021               Content Version: 13.1  © 6510-2350 Healthwise, [employer]. Care instructions adapted under license by [facility] ([facility]).  If you have questions about a medical condition or this instruction, always ask your healthcare professional. Daylin Gallardo disclaims any warranty or liability for your use of this information.

## 2022-03-02 ENCOUNTER — TELEPHONE (OUTPATIENT)
Dept: CARDIOLOGY CLINIC | Age: 47
End: 2022-03-02

## 2022-03-02 NOTE — TELEPHONE ENCOUNTER
Spoke with patient advised of CAM patch monitor results. The monitor was worn from 2/9/2022 -2/23/2022 and showed predominately NSR. No atrial fibrillation or atrial fibrillation was seen. Discussed implanting and ILR patient agreeable. Loop Implant  Pre Procedure Instructions     Date: 3/18/2022    Arrive at: 9:30 am     Procedure time: 10:30  am      The morning of your procedure you will park in the [facility] AT [facility] and report directly to the cath lab to check in. At the information desk stay right and go all the way to the end of the jensen, this will take you directly to your check in desk for the cath lab. Pre-Procedure Instructions   1. Do not eat or drink anything for four hours prior to your procedure. 2. Do not use any lotions, creams or perfume the morning of procedure. 3. Cath lab will provide you with all post procedure instructions. 4. Bring their fang store password (android or apple) when having a loop recorder insertion. Case published to Juan and form emailed to Omari Balderrama in cath lab.

## 2022-03-08 DIAGNOSIS — I48.0 PAF (PAROXYSMAL ATRIAL FIBRILLATION) (HCC): ICD-10-CM

## 2022-03-17 NOTE — TELEPHONE ENCOUNTER
Noted case cancelled on [employer] and Nohemy Alejandro in the EP/ Cath lab was notified. I called and spoke with the patient advised to call back once he gets new insurance I would be happy to reschedule. Patient verbalized understanding.

## 2022-03-17 NOTE — TELEPHONE ENCOUNTER
Kayley Cunningham is calling in stating that he lost his job and does not have insurance. He wants to cancel his procedure tomorrow. He will call back when he gets another job and insurance. Kayley Cunningham can be reached at [phone].

## 2022-03-18 ENCOUNTER — HOSPITAL ENCOUNTER (OUTPATIENT)
Dept: CARDIAC CATH/INVASIVE PROCEDURES | Age: 47
Discharge: HOME | End: 2022-03-18